# Patient Record
Sex: FEMALE | URBAN - METROPOLITAN AREA
[De-identification: names, ages, dates, MRNs, and addresses within clinical notes are randomized per-mention and may not be internally consistent; named-entity substitution may affect disease eponyms.]

---

## 2022-02-15 ENCOUNTER — EMERGENCY (EMERGENCY)
Facility: HOSPITAL | Age: 22
LOS: 1 days | Discharge: HOME | End: 2022-02-15
Attending: EMERGENCY MEDICINE
Payer: COMMERCIAL

## 2022-02-15 VITALS
DIASTOLIC BLOOD PRESSURE: 86 MMHG | HEART RATE: 89 BPM | RESPIRATION RATE: 18 BRPM | HEIGHT: 61 IN | WEIGHT: 160.06 LBS | OXYGEN SATURATION: 98 % | TEMPERATURE: 98 F | SYSTOLIC BLOOD PRESSURE: 137 MMHG

## 2022-02-15 PROCEDURE — 73610 X-RAY EXAM OF ANKLE: CPT

## 2022-02-15 PROCEDURE — 73610 X-RAY EXAM OF ANKLE: CPT | Mod: 26,RT

## 2022-02-15 PROCEDURE — 99283 EMERGENCY DEPT VISIT LOW MDM: CPT

## 2022-02-15 PROCEDURE — 99284 EMERGENCY DEPT VISIT MOD MDM: CPT | Mod: 25

## 2022-02-15 PROCEDURE — 73630 X-RAY EXAM OF FOOT: CPT

## 2022-02-15 PROCEDURE — 73630 X-RAY EXAM OF FOOT: CPT | Mod: 26,RT

## 2022-02-15 RX ORDER — IBUPROFEN 200 MG
600 TABLET ORAL ONCE
Refills: 0 | Status: COMPLETED | OUTPATIENT
Start: 2022-02-15 | End: 2022-02-15

## 2022-02-15 RX ADMIN — Medication 600 MILLIGRAM(S): at 11:29

## 2022-02-15 NOTE — ED PROVIDER NOTE - PATIENT PORTAL LINK FT
You can access the FollowMyHealth Patient Portal offered by [facility] by registering at the following website: [email or web address]. By joining [employer]’s FollowMyHealth portal, you will also be able to view your health information using other applications (apps) compatible with our system.

## 2022-02-15 NOTE — ED PROVIDER NOTE - OBJECTIVE STATEMENT
Pt is a 21y F with no PMH presenting for R foot and ankle pain since last night. She states she stepped on a vacuum plug and fell backwards but denies any head injury or other trauma. Pt states she twisted her R ankle in the process. She has been walking on it with a limp. She has bruising to the bottom of the R mid foot. NKDA. No other complaints.

## 2022-02-15 NOTE — ED PROVIDER NOTE - MUSCULOSKELETAL [-], MLM
Patient last saw you in the office on 1/19/2022.   Patient has an upcoming appointment on NA.  Patient's last physical was 1/4/2022.  Last refill was 1/20/2022 for 300 mg capsules.   Please advise, unable to refill per protocol.   
no limited range of motion

## 2022-02-15 NOTE — ED ADULT TRIAGE NOTE - CHIEF COMPLAINT QUOTE
pt c/o right foot pain steppped on vacuum part last night, today c/o >pain to foot, + bruise  A&ox3, resp wnl, limping

## 2022-02-15 NOTE — ED PROVIDER NOTE - CARE PROVIDER_API CALL
Dann Gutierrez (DPM)  Podiatric Medicine and Surgery  [address] [place] [address]  Phone: [phone]  Fax: [phone]  Follow Up Time:

## 2022-02-15 NOTE — ED PROVIDER NOTE - ATTENDING CONTRIBUTION TO CARE
22 y/o F presents for right foot and ankle pain since last night s/p stepping on a vacuum plug, twisting her ankle.    AP - NAD< well appearing, right lateral ankle TTP, ecchymoses to the plantar aspect of the foot, 1 cm blood blister on plantar aspect of the foot. 2+ DP pulses, normal sensation.    XR negative, will place in ace wrap and surgical shoe, podiatry follow up.

## 2022-02-15 NOTE — ED PROVIDER NOTE - PROGRESS NOTE DETAILS
Pt states no chance of pregnancy. xray reviewed and no acute findings. Will dc with ace wrap and surgical shoe and have f/u with podiatry if continued pain.

## 2022-02-16 PROBLEM — Z00.00 ENCOUNTER FOR PREVENTIVE HEALTH EXAMINATION: Status: ACTIVE | Noted: 2022-02-16

## 2024-05-31 NOTE — ED PROVIDER NOTE - NSFOLLOWUPINSTRUCTIONS_ED_ALL_ED_FT
Detail Level: Zone Use ace wrap/surgical shoe/ and crutches for ambulation.  Take tylenol/motrin for pain.  Follow up with podiatry if pain persists and not improving.

## 2024-11-15 ENCOUNTER — NON-APPOINTMENT (OUTPATIENT)
Age: 24
End: 2024-11-15

## 2024-11-20 ENCOUNTER — APPOINTMENT (OUTPATIENT)
Dept: ORTHOPEDIC SURGERY | Facility: CLINIC | Age: 24
End: 2024-11-20

## 2024-11-20 VITALS — WEIGHT: 140 LBS | HEIGHT: 64 IN | BODY MASS INDEX: 23.9 KG/M2

## 2024-11-20 DIAGNOSIS — S60.211A CONTUSION OF RIGHT WRIST, INITIAL ENCOUNTER: ICD-10-CM

## 2024-11-20 DIAGNOSIS — Z78.9 OTHER SPECIFIED HEALTH STATUS: ICD-10-CM

## 2024-11-20 DIAGNOSIS — S63.501A UNSPECIFIED SPRAIN OF RIGHT WRIST, INITIAL ENCOUNTER: ICD-10-CM

## 2024-11-20 PROCEDURE — 99203 OFFICE O/P NEW LOW 30 MIN: CPT

## 2024-11-20 RX ORDER — NAPROXEN SODIUM 550 MG/1
550 TABLET ORAL
Qty: 60 | Refills: 2 | Status: ACTIVE | COMMUNITY
Start: 2024-11-20 | End: 1900-01-01

## 2025-01-21 ENCOUNTER — EMERGENCY (EMERGENCY)
Facility: HOSPITAL | Age: 25
LOS: 1 days | Discharge: HOME | End: 2025-01-21
Attending: EMERGENCY MEDICINE
Payer: SELF-PAY

## 2025-01-21 VITALS
HEIGHT: 61 IN | SYSTOLIC BLOOD PRESSURE: 152 MMHG | OXYGEN SATURATION: 99 % | RESPIRATION RATE: 16 BRPM | HEART RATE: 70 BPM | WEIGHT: 135.36 LBS | DIASTOLIC BLOOD PRESSURE: 83 MMHG | TEMPERATURE: 99 F

## 2025-01-21 VITALS
DIASTOLIC BLOOD PRESSURE: 71 MMHG | HEART RATE: 66 BPM | SYSTOLIC BLOOD PRESSURE: 129 MMHG | TEMPERATURE: 99 F | OXYGEN SATURATION: 100 %

## 2025-01-21 PROCEDURE — 99284 EMERGENCY DEPT VISIT MOD MDM: CPT

## 2025-01-21 PROCEDURE — 99283 EMERGENCY DEPT VISIT LOW MDM: CPT

## 2025-01-21 RX ORDER — METHOCARBAMOL 500 MG
750 TABLET ORAL ONCE
Refills: 0 | Status: COMPLETED | OUTPATIENT
Start: 2025-01-21 | End: 2025-01-21

## 2025-01-21 RX ORDER — LIDOCAINE 50 MG/G
1 OINTMENT TOPICAL
Qty: 7 | Refills: 0
Start: 2025-01-21

## 2025-01-21 RX ORDER — METHOCARBAMOL 500 MG
1 TABLET ORAL
Qty: 15 | Refills: 0
Start: 2025-01-21 | End: 2025-01-25

## 2025-01-21 RX ORDER — LIDOCAINE 50 MG/G
1 OINTMENT TOPICAL ONCE
Refills: 0 | Status: COMPLETED | OUTPATIENT
Start: 2025-01-21 | End: 2025-01-21

## 2025-01-21 RX ORDER — IBUPROFEN 200 MG
600 TABLET ORAL ONCE
Refills: 0 | Status: COMPLETED | OUTPATIENT
Start: 2025-01-21 | End: 2025-01-21

## 2025-01-21 RX ADMIN — Medication 750 MILLIGRAM(S): at 10:14

## 2025-01-21 RX ADMIN — LIDOCAINE 1 PATCH: 50 OINTMENT TOPICAL at 10:13

## 2025-01-21 RX ADMIN — Medication 600 MILLIGRAM(S): at 10:14
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
## 2025-01-21 NOTE — ED PROVIDER NOTE - PHYSICAL EXAMINATION
Const: AOX3 nontoxic appearing, no apparent respiratory or physical distress. Stable gait   HEENT: NC/AT. Moist mucous membranes.  Eyes: MARTIN. EOMI  Neck: Soft and supple. Full ROM without pain.No midline tenderness to palpation  Cardiac: Regular rate and regular rhythm. +S1/S2. No murmurs.  no ecchymosis noted  Resp: Speaking in full sentences. No evidence of respiratory distress. No wheezes, rales or rhonchi. No adventitious breath sounds   Abd: Soft, non-tender, non-distended. Normal bowel sounds in all 4 quadrants.  no ecchymosis noted  Back: Spine midline and non-tender. No CVAT.  Skin: No rashes, abrasions or lacerations.  Neuro: Awake, alert & oriented x 3. Moves all extremities symmetrically.
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
## 2025-01-21 NOTE — ED PROVIDER NOTE - PATIENT PORTAL LINK FT
You can access the FollowMyHealth Patient Portal offered by [facility] by registering at the following website: [email or web address]. By joining [employer]’s FollowMyHealth portal, you will also be able to view your health information using other applications (apps) compatible with our system.

## 2025-01-21 NOTE — ED ADULT TRIAGE NOTE - ISOLATION TYPE:
None
as above  dd: legionaires (LRTI and diarrhea) vs other bacterial PNA vs viral PNA  tylenol prn for fever and pain  mucinex 1200 BID for cough  c/w ctz and azithryomycin   acceptable saturation 93-95% on RA   monitor saturation and if needed consider supplimental oxygen  f/u atypical pna panel  f/u procal  f/u blood and sputum culture (cough currently dry)  f/u rvp

## 2025-01-21 NOTE — ED PROVIDER NOTE - CARE PROVIDER_API CALL
Elmer Morrison  Neurosurgery  [address] [place] [id number]  Phone: [phone]  Fax: [phone]  Follow Up Time: 7-10 Days
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
